# Patient Record
Sex: MALE | Race: WHITE | Employment: PART TIME | URBAN - METROPOLITAN AREA
[De-identification: names, ages, dates, MRNs, and addresses within clinical notes are randomized per-mention and may not be internally consistent; named-entity substitution may affect disease eponyms.]

---

## 2021-03-29 ENCOUNTER — OFFICE VISIT (OUTPATIENT)
Dept: FAMILY MEDICINE | Facility: CLINIC | Age: 21
End: 2021-03-29

## 2021-03-29 VITALS
OXYGEN SATURATION: 95 % | HEART RATE: 65 BPM | DIASTOLIC BLOOD PRESSURE: 101 MMHG | HEIGHT: 71 IN | TEMPERATURE: 98.5 F | SYSTOLIC BLOOD PRESSURE: 163 MMHG | BODY MASS INDEX: 26.74 KG/M2 | WEIGHT: 191 LBS

## 2021-03-29 DIAGNOSIS — F41.9 ANXIETY: ICD-10-CM

## 2021-03-29 DIAGNOSIS — E10.9 CONTROLLED DIABETES MELLITUS TYPE 1 WITHOUT COMPLICATIONS (H): ICD-10-CM
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
## 2021-03-29 DIAGNOSIS — F32.1 CURRENT MODERATE EPISODE OF MAJOR DEPRESSIVE DISORDER, UNSPECIFIED WHETHER RECURRENT (H): ICD-10-CM

## 2021-03-29 DIAGNOSIS — R03.0 ELEVATED BLOOD PRESSURE READING WITHOUT DIAGNOSIS OF HYPERTENSION: ICD-10-CM

## 2021-03-29 DIAGNOSIS — F10.10 ALCOHOL ABUSE, EPISODIC DRINKING BEHAVIOR: ICD-10-CM

## 2021-03-29 LAB
ALBUMIN SERPL-MCNC: 4 G/DL (ref 3.4–5)
ALP SERPL-CCNC: 114 U/L (ref 40–150)
ALT SERPL W P-5'-P-CCNC: 116 U/L (ref 0–70)
ANION GAP SERPL CALCULATED.3IONS-SCNC: 5 MMOL/L (ref 3–14)
AST SERPL W P-5'-P-CCNC: 187 U/L (ref 0–45)
BASOPHILS # BLD AUTO: 0 10E9/L (ref 0–0.2)
BASOPHILS NFR BLD AUTO: 0.6 %
BILIRUB SERPL-MCNC: 0.6 MG/DL (ref 0.2–1.3)
BUN SERPL-MCNC: 13 MG/DL (ref 7–30)
CALCIUM SERPL-MCNC: 9.2 MG/DL (ref 8.5–10.1)
CHLORIDE SERPL-SCNC: 101 MMOL/L (ref 94–109)
CO2 SERPL-SCNC: 30 MMOL/L (ref 20–32)
CREAT SERPL-MCNC: 1.18 MG/DL (ref 0.66–1.25)
DIFFERENTIAL METHOD BLD: NORMAL
EOSINOPHIL # BLD AUTO: 0.1 10E9/L (ref 0–0.7)
EOSINOPHIL NFR BLD AUTO: 1.7 %
ERYTHROCYTE [DISTWIDTH] IN BLOOD BY AUTOMATED COUNT: 13 % (ref 10–15)
GFR SERPL CREATININE-BSD FRML MDRD: 88 ML/MIN/{1.73_M2}
GLUCOSE SERPL-MCNC: 312 MG/DL (ref 70–99)
HBA1C MFR BLD: 7.1 % (ref 0–5.6)
HCT VFR BLD AUTO: 41.7 % (ref 40–53)
HGB BLD-MCNC: 14.4 G/DL (ref 13.3–17.7)
IMM GRANULOCYTES # BLD: 0 10E9/L (ref 0–0.4)
IMM GRANULOCYTES NFR BLD: 0.3 %
LYMPHOCYTES # BLD AUTO: 1.4 10E9/L (ref 0.8–5.3)
LYMPHOCYTES NFR BLD AUTO: 21.1 %
MCH RBC QN AUTO: 30.6 PG (ref 26.5–33)
MCHC RBC AUTO-ENTMCNC: 34.5 G/DL (ref 31.5–36.5)
MCV RBC AUTO: 89 FL (ref 78–100)
MONOCYTES # BLD AUTO: 0.6 10E9/L (ref 0–1.3)
MONOCYTES NFR BLD AUTO: 8.5 %
NEUTROPHILS # BLD AUTO: 4.4 10E9/L (ref 1.6–8.3)
NEUTROPHILS NFR BLD AUTO: 67.8 %
NRBC # BLD AUTO: 0 10*3/UL
NRBC BLD AUTO-RTO: 0 /100
PLATELET # BLD AUTO: 277 10E9/L (ref 150–450)
POTASSIUM SERPL-SCNC: 4.2 MMOL/L (ref 3.4–5.3)
PROT SERPL-MCNC: 8 G/DL (ref 6.8–8.8)
RBC # BLD AUTO: 4.71 10E12/L (ref 4.4–5.9)
SODIUM SERPL-SCNC: 135 MMOL/L (ref 133–144)
WBC # BLD AUTO: 6.5 10E9/L (ref 4–11)

## 2021-03-29 RX ORDER — PROCHLORPERAZINE 25 MG/1
SUPPOSITORY RECTAL
Qty: 3 EACH | Refills: 11 | Status: SHIPPED | OUTPATIENT
Start: 2021-03-29 | End: 2022-04-21

## 2021-03-29 ASSESSMENT — ANXIETY QUESTIONNAIRES
3. WORRYING TOO MUCH ABOUT DIFFERENT THINGS: SEVERAL DAYS
5. BEING SO RESTLESS THAT IT IS HARD TO SIT STILL: NOT AT ALL
1. FEELING NERVOUS, ANXIOUS, OR ON EDGE: SEVERAL DAYS
2. NOT BEING ABLE TO STOP OR CONTROL WORRYING: SEVERAL DAYS
GAD7 TOTAL SCORE: 5
IF YOU CHECKED OFF ANY PROBLEMS ON THIS QUESTIONNAIRE, HOW DIFFICULT HAVE THESE PROBLEMS MADE IT FOR YOU TO DO YOUR WORK, TAKE CARE OF THINGS AT HOME, OR GET ALONG WITH OTHER PEOPLE: SOMEWHAT DIFFICULT
7. FEELING AFRAID AS IF SOMETHING AWFUL MIGHT HAPPEN: NOT AT ALL
6. BECOMING EASILY ANNOYED OR IRRITABLE: SEVERAL DAYS

## 2021-03-29 ASSESSMENT — ENCOUNTER SYMPTOMS
EYE PAIN: 0
STRIDOR: 0
DIAPHORESIS: 0
CHEST TIGHTNESS: 0
SHORTNESS OF BREATH: 0
APPETITE CHANGE: 0
ARTHRALGIAS: 1
JOINT SWELLING: 1
ACTIVITY CHANGE: 0
NERVOUS/ANXIOUS: 1
FACIAL SWELLING: 1
PALPITATIONS: 1
SLEEP DISTURBANCE: 1
SINUS PAIN: 0

## 2021-03-29 ASSESSMENT — PATIENT HEALTH QUESTIONNAIRE - PHQ9
5. POOR APPETITE OR OVEREATING: SEVERAL DAYS
SUM OF ALL RESPONSES TO PHQ QUESTIONS 1-9: 6

## 2021-03-29 ASSESSMENT — MIFFLIN-ST. JEOR: SCORE: 1904.85

## 2021-03-29 NOTE — NURSING NOTE
"20 year old  Chief Complaint   Patient presents with     Return to Work note     Mitul Morris       Blood pressure (!) 163/101, pulse 65, temperature 98.5  F (36.9  C), temperature source Oral, height 1.814 m (5' 11.4\"), weight 86.6 kg (191 lb), SpO2 95 %. Body mass index is 26.34 kg/m .      There is no problem list on file for this patient.      Wt Readings from Last 2 Encounters:   03/29/21 86.6 kg (191 lb)     BP Readings from Last 3 Encounters:   03/29/21 (!) 163/101       No Known Allergies    Current Outpatient Medications   Medication     INSULIN PUMP - OUTPATIENT     No current facility-administered medications for this visit.        Social History     Tobacco Use     Smoking status: Former Smoker     Types: Other     Smokeless tobacco: Former User   Substance Use Topics     Alcohol use: None     Drug use: None       Honoring Choices - Health Care Directive Guide offered to patient at time of visit.    There are no preventive care reminders to display for this patient.      There is no immunization history on file for this patient.    No results found for: PAP      No lab results found.    PHQ-2 ( 1999 Pfizer) 3/29/2021   Q1: Little interest or pleasure in doing things 0   Q2: Feeling down, depressed or hopeless 1   PHQ-2 Score 1       PHQ-9 SCORE 3/29/2021   PHQ-9 Total Score 6       DELMIS-7 SCORE 3/29/2021   Total Score 5       No flowsheet data found.      Holly Hernandez CMA  March 29, 2021 3:24 PM    "

## 2021-03-29 NOTE — LETTER
Return to  Work Release    Date: 3/29/2021      Name: Alex Hernandez                       YOB: 2000    Medical Record Number: [id number]    The patient was seen at: [facility]    Restrictions if any: none    Resume Activity: With no limitations.    Return to work:  April 5, 2021        _________________________  Lilian Castellanos NP
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
## 2021-03-29 NOTE — LETTER
Return to  Work Release    Date: 3/29/2021      Name: Alex Hernandez                       YOB: 2000    Medical Record Number: [id number]    The patient was seen at: [facility]    Restrictions if any: none    Resume Activity: With no limitations.    Please notify me if any questions.        _________________________  Lilian Castellanos NP

## 2021-03-30 ASSESSMENT — ANXIETY QUESTIONNAIRES: GAD7 TOTAL SCORE: 5

## 2021-04-01 ENCOUNTER — VIRTUAL VISIT (OUTPATIENT)
Dept: FAMILY MEDICINE | Facility: CLINIC | Age: 21
End: 2021-04-01

## 2021-04-01 DIAGNOSIS — E10.65 TYPE 1 DIABETES MELLITUS WITH HYPERGLYCEMIA (H): ICD-10-CM

## 2021-04-01 DIAGNOSIS — R79.89 ELEVATED LIVER FUNCTION TESTS: Primary | ICD-10-CM

## 2021-04-01 NOTE — TELEPHONE ENCOUNTER
RECORDS RECEIVED FROM: internal   DATE RECEIVED: 4.14.21    NOTES (FOR ALL VISITS) STATUS DETAILS   OFFICE NOTES from referring provider  internal Lilian Castellanos   OFFICE NOTES from other specialist na    ED NOTES na    OPERATIVE REPORT  (thyroid, pituitary, adrenal, parathyroid) na    MEDICATION LIST  internal    IMAGING      DEXASCAN na    MRI (BRAIN) na    XR (Chest) na    CT (HEAD/NECK/CHEST/ABDOMEN) na    NUCLEAR  na    ULTRASOUND (HEAD/NECK) na    LABS     DIABETES: HBGA1C, CREATININE, FASTING LIPIDS, MICROALBUMIN URINE, POTASSIUM, TSH, T4    THYROID: TSH, T4, CBC, THYRODLONULIN, TOTAL T3, FREE T4, CALCITONIN, CEA internal Cbc- 3.29.21  HBGA1C 3.29.21

## 2021-04-01 NOTE — PROGRESS NOTES
Alex is a 20 year old who is being evaluated via a billable video visit.      How would you like to obtain your AVS? MyChart  If the video visit is dropped, the invitation should be resent by: Send to e-mail at: [email or web address]  Will anyone else be joining your video visit? No    Video Start Time: 1629    Subjective   Alex is a 20 year old who presents for the following health issues, elevated liver function tests.  Bala does binge drink alcohol, he also drinks most days at home after work.  His binge drinking occurs on the weekends, he drinks vodka.  He is aware and we have discussed his drinking.  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      Bala uses a creatine supplement for his physicality and work outs, he is wondering if that has an affect on his liver.      He is managing his diabetes well, he was able to get his supplies.    Review of Systems   CONSTITUTIONAL: NEGATIVE for fever, chills, change in weight  ENT/MOUTH: NEGATIVE for ear, mouth and throat problems  RESP: NEGATIVE for significant cough or SOB  CV: NEGATIVE for chest pain, palpitations or peripheral edema      Objective       Vitals:  No vitals were obtained today due to virtual visit.    Physical Exam   GENERAL: Healthy, alert and no distress  EYES: Eyes grossly normal to inspection.  No discharge or erythema, or obvious scleral/conjunctival abnormalities.  RESP: No audible wheeze, cough, or visible cyanosis.  No visible retractions or increased work of breathing.    SKIN: Visible skin clear. No significant rash, abnormal pigmentation or lesions.  NEURO: Cranial nerves grossly intact.  Mentation and speech appropriate for age.  PSYCH: Mentation appears normal, affect normal/bright, judgement and insight intact, normal speech and appearance well-groomed.    1. Elevated liver function  tests    - Comprehensive metabolic panel; Future    2. Type 1 diabetes mellitus with hyperglycemia (H)  Bala has an appointment with an endocrinologist on 4/14/21.    - Hemoglobin A1c; Future    Bala is going to reduce the amount of alcohol he drinks, starting on his own.  He is going to continue to reach out to resources so he can get the support he will need.  He is aware of the significance of the alcohol in general and on his health.    I suggested he go on line as I will to look at creatine and the liver.  I did that and it is not studied well but it is partially metabolized in the liver, so can impact liver function if used in excess.    Follow up one month, we will reach out and get labs and virtual visit scheduled.    Video-Visit Details    Type of service:  Video Visit    Video End Time: 1650    Originating Location (pt. Location): Home    Distant Location (provider location):  [facility]     Platform used for Video Visit: [place]

## 2021-04-02 ENCOUNTER — TELEPHONE (OUTPATIENT)
Dept: FAMILY MEDICINE | Facility: CLINIC | Age: 21
End: 2021-04-02

## 2021-04-02 NOTE — TELEPHONE ENCOUNTER
First reached out to patient via phone but VM box was full. Sent patient a [employer] message to call and schedule both appointments.     Holly Hernandez CMA

## 2021-04-02 NOTE — TELEPHONE ENCOUNTER
----- Message from Lilian Castellanos NP sent at 4/1/2021  5:01 PM CDT -----  Please connect with Bala to set up a virtual visit for ~1+ month or so.  He needs to have his labs first, enough time to get results back to discuss.  He will need that lab only appointment set up too.    Thank you!

## 2021-04-13 NOTE — PROGRESS NOTES
Outcome for 04/13/21 2:32 PM :Patient is sharing data via clinic device website and patient has been instructed to update data on website. Find login information by using .Tech Sent code to patient Char for is pump and Emailed pt to share his dexcom code with us        Alex Scottchar  is being evaluated via a billable video visit.      How would you like to obtain your AVS? [employer]  For the video visit, send the invitation by: Char  Will anyone else be joining your video visit? No

## 2021-04-13 NOTE — PATIENT INSTRUCTIONS
Alex,     Here are the changes we discussed about in bold.     Basal  0.85  CHO   Midnight to 10 AM in the morning 1 unit for every 15 g of carbohydrate  10 AM in the morning to midnight 1 unit for every 13 g of carbohydrates   Sensitivity factor   Midnight to 8 PM in the evening 1 unit for every 50 mg/dL of glucose  8 PM to midnight 1 unit for every 60 mg/dL of glucose (dose reduced]  Targets   Insulin active 3 hours     If you notice persistent low blood sugars below 70; please call us at the following number [phone] select option #3.     Please schedule an appointment with the diabetes educator.

## 2021-04-14 ENCOUNTER — VIRTUAL VISIT (OUTPATIENT)
Dept: ENDOCRINOLOGY | Facility: CLINIC | Age: 21
End: 2021-04-14
Payer: COMMERCIAL

## 2021-04-14 ENCOUNTER — PRE VISIT (OUTPATIENT)
Dept: ENDOCRINOLOGY | Facility: CLINIC | Age: 21
End: 2021-04-14

## 2021-04-14 DIAGNOSIS — E10.9 TYPE 1 DIABETES MELLITUS WITHOUT COMPLICATION (H): Primary | ICD-10-CM

## 2021-04-14 PROCEDURE — 99204 OFFICE O/P NEW MOD 45 MIN: CPT | Mod: 95 | Performed by: INTERNAL MEDICINE

## 2021-04-14 NOTE — LETTER
4/14/2021       RE: Alex Hernandez  [address] [place] [facility] [address]     Dear Colleague,    Thank you for referring your patient, Alex Hernandez, to the [facility] [place] [facility]. Please see a copy of my visit note below.    Outcome for 04/13/21 2:32 PM :Patient is sharing data via clinic device website and patient has been instructed to update data on website. Find login information by using .[employer] Sent code to patient Xspandhart for is pump and Emailed pt to share his dexcom code with us        Alex Hernandez  is being evaluated via a billable video visit.      How would you like to obtain your AVS? [employer]  For the video visit, send the invitation by: [employer]  Will anyone else be joining your video visit? No          Endocrinology virtual Visit    Chief Complaint: Video Visit (dm1)     Information obtained from:Patient      Assessment/Treatment Plan:      Type 1 diabetes without known complications  Diagnosed about 2 years ago.  Currently using insulin pump.  [place].  We reviewed Dexcom data.  He has been experiencing postprandial hyperglycemia which was addressed by slightly increasing the carbohydrate ratio today.  He has had a few lows following stacking up insulin due to multiple correction doses -wait at least 3 hours before taking additional correction doses after a correction bolus.  We are reducing sensitivity in the evenings.  Overall, he did not have any significant lows (lows less than 1% of the time].   Plan  Basal -no change  0.85  CHO   Midnight to 10 AM in the morning 1 unit for every 15 g of carbohydrate  10 AM in the morning to midnight 1 unit for every 13 g of carbohydrates   Sensitivity factor   Midnight to 8 PM in the evening 1 unit for every 50 mg/dL of glucose  8 PM to midnight 1 unit for every 60 mg/dL of glucose (dose reduced]  Targets    Insulin active 3 hours     The changes made today are indicated in bold, insulin pump changes were made at the time of visit by the patient and correct entry of pump settings was verified.  We will have him follow-up with diabetes educator in 1-2 weeks and at that time we will download continuous glucose monitor again to reassess.      Patient Instructions     Alex,     Here are the changes we discussed about in bold.     Basal  0.85  CHO   Midnight to 10 AM in the morning 1 unit for every 15 g of carbohydrate  10 AM in the morning to midnight 1 unit for every 13 g of carbohydrates   Sensitivity factor   Midnight to 8 PM in the evening 1 unit for every 50 mg/dL of glucose  8 PM to midnight 1 unit for every 60 mg/dL of glucose (dose reduced]  Targets   Insulin active 3 hours     If you notice persistent low blood sugars below 70; please call us at the following number [phone] select option #3.     Please schedule an appointment with the diabetes educator.      I will contact the patient with the test results.  Return to clinic in 2 months.    Test and/or medications prescribed today:  Orders Placed This Encounter   Procedures     AMBULATORY ADULT DIABETES EDUCATOR REFERRAL         Aleyda Matos MD  Staff Endocrinologist    Division of Endocrinology and Diabetes      Subjective:         HPI: Alex Hernandez is a 20 year old male with history of Type 1 diabetes who is seen in consultation at Lilian Castellanos's request for the same.     Type 1 diabetes diagnosed at the age of 18.  Has been using insulin pump since diagnosis.    Currently using Omni pod.  Pump information was not downloaded by the patient however per verbal report his current settings are as follows.    Pump settings  Basal  0.85  CHO 1:15  Sensitivity factor 1:50  Targets 130 [200]  Insulin active 3 hours   No recent history of DKA or severe hypoglycemia.  He uses Dexcom continuous glucose monitor which was downloaded  today and we reviewed the data together.  He has noted that he has postprandial hyperglycemia particularly following lunch and dinner.  He takes multiple correction doses which has been leading to blood sugars to less than 70 however these episodes are not frequent.                            No Known Allergies    Current Outpatient Medications   Medication Sig Dispense Refill     Continuous Blood Gluc Sensor (DEXCOM G6 SENSOR) MISC Change every 10 days. 3 each 11     glucagon 1 MG kit Inject 1 mg into the muscle as needed for low blood sugar 1 each 3     insulin aspart prot & aspart (NOVOLOG MIX 70/30 FLEXPEN) (70-30) 100 UNIT/ML pen        INSULIN PUMP - OUTPATIENT          Review of Systems     8 point review system is negative or is as per HPI above.    Past Medical History:   Diagnosis Date     Depressive disorder      Type 1 diabetes (H)        No past surgical history on file.    Family History   Problem Relation Age of Onset     Lung Cancer Maternal Grandfather            Objective:   GENERAL: Healthy, alert and no distress  EYES: Eyes grossly normal to inspection.  No discharge or erythema, or obvious scleral/conjunctival abnormalities.  RESP: No audible wheeze, cough, or visible cyanosis.    SKIN: Visible skin clear.   NEURO: Cranial nerves grossly intact.    PSYCH: Mentation appears normal, affect normal/bright, judgement and insight intact, normal speech.    In House Labs:   Lab Results   Component Value Date    A1C 7.1 03/29/2021       Creatinine   Date Value Ref Range Status   03/29/2021 1.18 0.66 - 1.25 mg/dL Final   ]    Video-Visit Details    Type of service:  Video Visit  All times are in your local time  1st VideoStart: 04/14/2021 01:40 pm  Stop: 04/14/2021 02:10 pm    Originating Location (pt. Location): Home    Distant Location (provider location):  [facility]     Platform used for Video Visit: [employer]  56 minutes spent on the date of the encounter doing chart review,  history and exam, documentation and further activities per the note.

## 2021-04-28 ENCOUNTER — VIRTUAL VISIT (OUTPATIENT)
Dept: EDUCATION SERVICES | Facility: CLINIC | Age: 21
End: 2021-04-28
Attending: INTERNAL MEDICINE
Payer: COMMERCIAL

## 2021-04-28 DIAGNOSIS — E10.9 TYPE 1 DIABETES MELLITUS WITHOUT COMPLICATION (H): ICD-10-CM

## 2021-04-28 PROCEDURE — 99207 PR NO BILLABLE SERVICE THIS VISIT: CPT

## 2021-05-02 ENCOUNTER — HEALTH MAINTENANCE LETTER (OUTPATIENT)
Age: 21
End: 2021-05-02

## 2021-05-25 ENCOUNTER — TELEPHONE (OUTPATIENT)
Dept: FAMILY MEDICINE | Facility: CLINIC | Age: 21
End: 2021-05-25
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
## 2021-08-22 ENCOUNTER — HEALTH MAINTENANCE LETTER (OUTPATIENT)
Age: 21
End: 2021-08-22

## 2021-10-17 ENCOUNTER — HEALTH MAINTENANCE LETTER (OUTPATIENT)
Age: 21
End: 2021-10-17

## 2021-12-12 ENCOUNTER — HEALTH MAINTENANCE LETTER (OUTPATIENT)
Age: 21
End: 2021-12-12

## 2022-04-03 ENCOUNTER — HEALTH MAINTENANCE LETTER (OUTPATIENT)
Age: 22
End: 2022-04-03

## 2022-04-20 DIAGNOSIS — E10.9 CONTROLLED DIABETES MELLITUS TYPE 1 WITHOUT COMPLICATIONS (H): ICD-10-CM

## 2022-04-21 RX ORDER — PROCHLORPERAZINE 25 MG/1
SUPPOSITORY RECTAL
Qty: 3 EACH | Refills: 2 | Status: SHIPPED | OUTPATIENT
Start: 2022-04-21

## 2022-04-21 NOTE — TELEPHONE ENCOUNTER
Continuous Blood Gluc Sensor (DEXCOM G6 SENSOR) MISC    Last Written Prescription Date:  3/29/21  Last Fill Quantity: 3,   # refills: 11  Last Office Visit : 4/14/21  Future Office visit:  None    Scheduling has been notified to contact the pt for appointment.

## 2022-05-29 ENCOUNTER — HEALTH MAINTENANCE LETTER (OUTPATIENT)
Age: 22
End: 2022-05-29

## 2022-07-24 ENCOUNTER — HEALTH MAINTENANCE LETTER (OUTPATIENT)
Age: 22
End: 2022-07-24

## 2022-10-02 ENCOUNTER — HEALTH MAINTENANCE LETTER (OUTPATIENT)
Age: 22
End: 2022-10-02

## 2023-02-11 ENCOUNTER — HEALTH MAINTENANCE LETTER (OUTPATIENT)
Age: 23
End: 2023-02-11

## 2023-05-20 ENCOUNTER — HEALTH MAINTENANCE LETTER (OUTPATIENT)
Age: 23
End: 2023-05-20

## 2023-06-04 ENCOUNTER — HEALTH MAINTENANCE LETTER (OUTPATIENT)
Age: 23
End: 2023-06-04

## 2023-10-21 ENCOUNTER — HEALTH MAINTENANCE LETTER (OUTPATIENT)
Age: 23
End: 2023-10-21

## 2024-03-09 ENCOUNTER — HEALTH MAINTENANCE LETTER (OUTPATIENT)
Age: 24
End: 2024-03-09